# Patient Record
Sex: MALE | Race: WHITE | URBAN - METROPOLITAN AREA
[De-identification: names, ages, dates, MRNs, and addresses within clinical notes are randomized per-mention and may not be internally consistent; named-entity substitution may affect disease eponyms.]

---

## 2019-12-04 ENCOUNTER — TELEPHONE (OUTPATIENT)
Dept: BEHAVIORAL HEALTH | Facility: CLINIC | Age: 64
End: 2019-12-04

## 2019-12-04 ENCOUNTER — HOSPITAL ENCOUNTER (EMERGENCY)
Facility: CLINIC | Age: 64
Discharge: PSYCHIATRIC OR SUBSTANCE USE FACILITY | End: 2019-12-05
Attending: EMERGENCY MEDICINE | Admitting: EMERGENCY MEDICINE
Payer: COMMERCIAL

## 2019-12-04 DIAGNOSIS — S11.91XA STAB WOUND OF NECK, INITIAL ENCOUNTER: ICD-10-CM

## 2019-12-04 DIAGNOSIS — R45.851 SUICIDAL IDEATION: ICD-10-CM

## 2019-12-04 PROCEDURE — 99285 EMERGENCY DEPT VISIT HI MDM: CPT | Mod: 25

## 2019-12-04 PROCEDURE — 90791 PSYCH DIAGNOSTIC EVALUATION: CPT

## 2019-12-04 RX ORDER — ESCITALOPRAM OXALATE 10 MG/1
10 TABLET ORAL DAILY
Status: ON HOLD | COMMUNITY
End: 2019-12-09

## 2019-12-04 RX ORDER — BUPROPION HYDROCHLORIDE 150 MG/1
150 TABLET ORAL EVERY MORNING
Status: ON HOLD | COMMUNITY
End: 2019-12-09

## 2019-12-04 ASSESSMENT — MIFFLIN-ST. JEOR: SCORE: 1620.46

## 2019-12-04 ASSESSMENT — ENCOUNTER SYMPTOMS: WOUND: 1

## 2019-12-05 ENCOUNTER — HOSPITAL ENCOUNTER (INPATIENT)
Facility: CLINIC | Age: 64
LOS: 4 days | Discharge: HOME | DRG: 885 | End: 2019-12-09
Attending: PSYCHIATRY & NEUROLOGY | Admitting: PSYCHIATRY & NEUROLOGY
Payer: COMMERCIAL

## 2019-12-05 VITALS
OXYGEN SATURATION: 97 % | RESPIRATION RATE: 18 BRPM | TEMPERATURE: 98.8 F | SYSTOLIC BLOOD PRESSURE: 113 MMHG | HEART RATE: 71 BPM | HEIGHT: 67 IN | WEIGHT: 190 LBS | BODY MASS INDEX: 29.82 KG/M2 | DIASTOLIC BLOOD PRESSURE: 78 MMHG

## 2019-12-05 DIAGNOSIS — F32.A DEPRESSIVE EPISODE: Primary | ICD-10-CM

## 2019-12-05 PROBLEM — T14.91XA SUICIDE ATTEMPT (H): Status: ACTIVE | Noted: 2019-12-05

## 2019-12-05 LAB
AMPHETAMINES UR QL SCN: NEGATIVE
BARBITURATES UR QL: NEGATIVE
BENZODIAZ UR QL: NEGATIVE
CANNABINOIDS UR QL SCN: NEGATIVE
COCAINE UR QL: NEGATIVE
OPIATES UR QL SCN: NEGATIVE
PCP UR QL SCN: NEGATIVE

## 2019-12-05 PROCEDURE — 12400001 ZZH R&B MH UMMC

## 2019-12-05 PROCEDURE — 25000132 ZZH RX MED GY IP 250 OP 250 PS 637: Performed by: STUDENT IN AN ORGANIZED HEALTH CARE EDUCATION/TRAINING PROGRAM

## 2019-12-05 PROCEDURE — 80307 DRUG TEST PRSMV CHEM ANLYZR: CPT | Performed by: EMERGENCY MEDICINE

## 2019-12-05 RX ORDER — ALUMINA, MAGNESIA, AND SIMETHICONE 2400; 2400; 240 MG/30ML; MG/30ML; MG/30ML
30 SUSPENSION ORAL EVERY 4 HOURS PRN
Status: DISCONTINUED | OUTPATIENT
Start: 2019-12-05 | End: 2019-12-09 | Stop reason: HOSPADM

## 2019-12-05 RX ORDER — HYDROXYZINE HYDROCHLORIDE 25 MG/1
25 TABLET, FILM COATED ORAL EVERY 4 HOURS PRN
Status: DISCONTINUED | OUTPATIENT
Start: 2019-12-05 | End: 2019-12-09 | Stop reason: HOSPADM

## 2019-12-05 RX ORDER — ACETAMINOPHEN 325 MG/1
650 TABLET ORAL EVERY 4 HOURS PRN
Status: DISCONTINUED | OUTPATIENT
Start: 2019-12-05 | End: 2019-12-09 | Stop reason: HOSPADM

## 2019-12-05 RX ORDER — BUPROPION HYDROCHLORIDE 150 MG/1
150 TABLET ORAL DAILY
Status: DISCONTINUED | OUTPATIENT
Start: 2019-12-05 | End: 2019-12-06

## 2019-12-05 RX ORDER — ESCITALOPRAM OXALATE 10 MG/1
10 TABLET ORAL DAILY
Status: DISCONTINUED | OUTPATIENT
Start: 2019-12-05 | End: 2019-12-06

## 2019-12-05 RX ADMIN — BUPROPION 150 MG: 150 TABLET, EXTENDED RELEASE ORAL at 20:18

## 2019-12-05 RX ADMIN — ESCITALOPRAM OXALATE 10 MG: 10 TABLET ORAL at 20:18

## 2019-12-05 ASSESSMENT — ACTIVITIES OF DAILY LIVING (ADL)
FALL_HISTORY_WITHIN_LAST_SIX_MONTHS: NO
RETIRED_COMMUNICATION: 0-->UNDERSTANDS/COMMUNICATES WITHOUT DIFFICULTY
BATHING: 0-->INDEPENDENT
TRANSFERRING: 0-->INDEPENDENT
SWALLOWING: 0-->SWALLOWS FOODS/LIQUIDS WITHOUT DIFFICULTY
DRESS: 0-->INDEPENDENT
COGNITION: 0 - NO COGNITION ISSUES REPORTED
TOILETING: 0-->INDEPENDENT
RETIRED_EATING: 0-->INDEPENDENT
AMBULATION: 0-->INDEPENDENT

## 2019-12-05 ASSESSMENT — MIFFLIN-ST. JEOR: SCORE: 1536.55

## 2019-12-05 NOTE — ED NOTES
"Writer spoke with pt's wife Dorota. She is angry and refusing pt to be transferred to [facility]. She states \"whose paying for this? I have a major problem with you allowing him to make that decision when he is vulnerable. I want to speak to a manager. I will be there shortly to raise hell.\" MD, charge RN, and DEC updated.   "

## 2019-12-05 NOTE — ED NOTES
Report received. Patient visualized. Patient resting in bed with wife at bedside. After discussion, Patient and wife agree to transport and admission to [place]. Report to be called and patient will be transported for voluntary admission.

## 2019-12-05 NOTE — ED NOTES
Report called to Station 20 at [facility]. Nurse to Nurse report given to LAM Almeida. Will continue to monitor until transport.

## 2019-12-05 NOTE — TELEPHONE ENCOUNTER
S:  Per TUSHAR Valverde , pt wants to remain at [facility].  He has declined review at other facilities.    S:  Per Nicolle pt is now willing to go to [facility].    R:  Admit to station 20    accepts for himself   [place], willing to sign in      -  20, [place] informed 1425  -  ED, Nicolle informed 1423

## 2019-12-05 NOTE — TELEPHONE ENCOUNTER
S: [facility] ED seeking placement for 65yo male presenting with SI and plans     B: Hx Bipolar Disorder, comes to ED after taking a knife to his throat and cutting himself. Pt cuts required sutures. Pt was avi by the cutting and put the knife down and he waited for his wife to come home, and Pt daughter brought Pt into ED. Pt reports work and employment stressors, financial problems. Pt hx of IP MH in 2000. Pt hx of SA via carbon monoxide in garage. Pt denies medical concerns, is ambulatory. Pt denies CD hx. Pt continues to endorse SI with plans.     A: Medically cleared, utox pending, voluntary for admission     R: Pt placed on waitlist.

## 2019-12-05 NOTE — ED PROVIDER NOTES
No issues overnight.  Patient signed out to Dr. Mcdaniels with psych placement pending.     Chet Middleton MD  12/05/19 0783

## 2019-12-05 NOTE — ED PROVIDER NOTES
"  History     Chief Complaint:  Suicidal ideation    HPI   Arash Booker is a 64 year old male, with a history of OCD and bipolar 2 disorder, who presents with his daughter and son-in-law to the emergency department for evaluation of suicidal ideation. The patient reports he has been under increased stress at work and more financial pressure. He reports this stress and pressure became overwhelming to him today and he used a kitchen knife to cut his neck this afternoon around 1400 intending to harm himself. He denies any suicide attempts in the past. Of note, patient's last tetanus was in 2012.    Allergies:  No known drug allergies     Medications:    Wellbutrin  Lexapro  Lamictal    Past Medical History:    OCD  Bipolar 2 disorder    Past Surgical History:    History reviewed. No pertinent surgical history.    Family History:    Cataracts  HTN    Social History:  Smoking status: Never  Alcohol use: None  Drug use: No  The patient presents to the emergency department with his daughter and son-in-law.  Marital Status:   [2]     Review of Systems   Skin: Positive for wound.   Psychiatric/Behavioral: Positive for self-injury and suicidal ideas.         Physical Exam   Patient Vitals for the past 24 hrs:   BP Temp Temp src Heart Rate Resp SpO2 Height Weight   12/04/19 2105 138/78 98.8  F (37.1  C) Oral 68 16 98 % 1.702 m (5' 7\") 86.2 kg (190 lb)     Physical Exam  General: No distress.   Head: No signs of trauma.   Mouth/Throat: Oropharynx moist.   Eyes: Conjunctivae are normal. Pupils are equal..   Neck: Normal range of motion.    Resp:No respiratory distress.   MSK: Normal range of motion. No obvious deformity.  Neuro: The patient is alert and interactive. GIBSON. Speech normal. GCS 15  Skin:  Lacerations, as seen in picture below, to the anterior left neck. No hard or soft signs of airway injury.  Psych: Flat affect, endorses thoughts of suicide.        Emergency Department Course   Emergency Department " Course:  Past medical records, nursing notes, and vitals reviewed.  2151: I performed an exam of the patient and obtained history, as documented above.     2328: I discussed the patient with Dr. Wells, of vascular surgery, who will come and evaluated the patient in the ER.    Findings and plan explained to the Patient and son and daughter who consents to admission.     Patient will be signed out to my partner, Dr. Middleton, pending bed placement. Appropriate paperwork is finished.  Impression & Plan    Medical Decision Making:  This patient is brought into the emerge department by his family after he attempted to hurt himself by stabbing himself in the neck with a knife.  He endorses thoughts of suicide with an active plan.  He will be admitted to the hospital and is initially voluntary but if he should decide he is not voluntary he is appropriate for a 72-hour hold.    His neck wound was examined by the trauma surgeon and cleared medically for admission to psychiatric floor.  No hard and soft signs of penetrating neck injury  Hard signs of vascular injury*   Severe or uncontrolled hemorrhage   Large, expanding, or pulsatile hematoma   Thrills or bruits   Shock unresponsive to IV fluid resuscitation   Absent or diminished radial pulse   Neurologic deficit (eg, hemiplegia) consistent with cerebral ischemia   Hard signs of aerodigestive injury*   Air bubbling from a wound   Massive hemoptysis or hematemesis   Respiratory distress   Soft signs of injury   Proximity wounds   Minor hemorrhage   Mild hypotension responsive to IV fluid resuscitation   Minor hemoptysis or hematemesis   Subcutaneous or mediastinal air   Non-pulsatile, non-expanding hematoma   Dysphonia   Dysphagia         Diagnosis:    ICD-10-CM   1. Suicidal ideation R45.851   2. Stab wound of neck, initial encounter S11.91XA     Disposition:  Signed out to my Dr. Middleton awaiting psych bed availability.    Shamir Jacobsen  12/4/2019    EMERGENCY  DEPARTMENT  Scribe Disclosure:  I, Shamir Delmar, am serving as a scribe at 9:51 PM on 12/4/2019 to document services personally performed by Salvador Bautista MD based on my observations and the provider's statements to me.      Salvador Bautista MD  12/05/19 0057

## 2019-12-05 NOTE — PHARMACY-ADMISSION MEDICATION HISTORY
Pharmacy Medication History  Admission medication history interview status for the 12/4/2019  admission is complete. See EPIC admission navigator for prior to admission medications     Medication history sources: Patient and Care Everywhere  Medication history source reliability: Good  Adherence assessment: Good    Significant changes made to the medication list:  none      Additional medication history information:   none    Medication reconciliation completed by provider prior to medication history? No    Time spent in this activity: 15 min        Prior to Admission medications    Medication Sig Last Dose Taking? Auth Provider   buPROPion (WELLBUTRIN XL) 150 MG 24 hr tablet Take 150 mg by mouth every morning 12/4/2019 at Unknown time Yes Reported, Patient   escitalopram (LEXAPRO) 10 MG tablet Take 10 mg by mouth daily 12/4/2019 at Unknown time Yes Reported, Patient

## 2019-12-05 NOTE — ED PROVIDER NOTES
Face-to-face at [facility].  The patient was accepted over at [facility] station 20.  The patient initially stated he did not want to leave [facility] and that he was voluntary.  It was then discussed with him that we do not have any beds here he then agreed to go to [facility] where a bed was obtained.  His wife then called and was very upset that we are transferring him over to [facility].  She was very angry with the nurse.  I did a face-to-face and explained to him that we do not have beds here today if he chooses to stay we may not have beds available.  I explained to him that I am not sure what his insurance would cover or not cover.  However when we do not have beds at this hospital we transfer out and typically that is covered due to lack of services here.  The patient is awaiting the wife's arrival and will discuss it with him.  He is on a health officer hold at this time.  However if he does not like to leave the 72-hour hold will be signed.     Malini Mcdaniels MD  12/05/19 1656

## 2019-12-05 NOTE — ED TRIAGE NOTES
Patient cut neck with a kitchen knife earlier today in an attempt to commit suicide. Patient has a previous suicide attempt back in 2000 and was placed on Lexapro and Wellbutrin which he has been taking as prescribed. Patient stated that he has had increased stress from work and financial concerns lately.

## 2019-12-06 LAB
ALBUMIN SERPL-MCNC: 3.9 G/DL (ref 3.4–5)
ALP SERPL-CCNC: 71 U/L (ref 40–150)
ALT SERPL W P-5'-P-CCNC: 30 U/L (ref 0–70)
ANION GAP SERPL CALCULATED.3IONS-SCNC: 3 MMOL/L (ref 3–14)
AST SERPL W P-5'-P-CCNC: 17 U/L (ref 0–45)
BASOPHILS # BLD AUTO: 0 10E9/L (ref 0–0.2)
BASOPHILS NFR BLD AUTO: 0.3 %
BILIRUB SERPL-MCNC: 0.5 MG/DL (ref 0.2–1.3)
BUN SERPL-MCNC: 20 MG/DL (ref 7–30)
CALCIUM SERPL-MCNC: 8.8 MG/DL (ref 8.5–10.1)
CHLORIDE SERPL-SCNC: 104 MMOL/L (ref 94–109)
CO2 SERPL-SCNC: 29 MMOL/L (ref 20–32)
CREAT SERPL-MCNC: 1.11 MG/DL (ref 0.66–1.25)
DIFFERENTIAL METHOD BLD: NORMAL
EOSINOPHIL # BLD AUTO: 0.1 10E9/L (ref 0–0.7)
EOSINOPHIL NFR BLD AUTO: 1.3 %
ERYTHROCYTE [DISTWIDTH] IN BLOOD BY AUTOMATED COUNT: 12 % (ref 10–15)
GFR SERPL CREATININE-BSD FRML MDRD: 71 ML/MIN/{1.73_M2}
GLUCOSE SERPL-MCNC: 102 MG/DL (ref 70–99)
HCT VFR BLD AUTO: 44.7 % (ref 40–53)
HGB BLD-MCNC: 14.7 G/DL (ref 13.3–17.7)
IMM GRANULOCYTES # BLD: 0 10E9/L (ref 0–0.4)
IMM GRANULOCYTES NFR BLD: 0.2 %
LYMPHOCYTES # BLD AUTO: 1.4 10E9/L (ref 0.8–5.3)
LYMPHOCYTES NFR BLD AUTO: 22 %
MCH RBC QN AUTO: 31.4 PG (ref 26.5–33)
MCHC RBC AUTO-ENTMCNC: 32.9 G/DL (ref 31.5–36.5)
MCV RBC AUTO: 96 FL (ref 78–100)
MONOCYTES # BLD AUTO: 0.4 10E9/L (ref 0–1.3)
MONOCYTES NFR BLD AUTO: 6.9 %
NEUTROPHILS # BLD AUTO: 4.3 10E9/L (ref 1.6–8.3)
NEUTROPHILS NFR BLD AUTO: 69.3 %
NRBC # BLD AUTO: 0 10*3/UL
NRBC BLD AUTO-RTO: 0 /100
PLATELET # BLD AUTO: 249 10E9/L (ref 150–450)
POTASSIUM SERPL-SCNC: 4.2 MMOL/L (ref 3.4–5.3)
PROT SERPL-MCNC: 7.6 G/DL (ref 6.8–8.8)
RBC # BLD AUTO: 4.68 10E12/L (ref 4.4–5.9)
SODIUM SERPL-SCNC: 136 MMOL/L (ref 133–144)
TSH SERPL DL<=0.005 MIU/L-ACNC: 2.02 MU/L (ref 0.4–4)
VIT B12 SERPL-MCNC: 357 PG/ML (ref 193–986)
WBC # BLD AUTO: 6.2 10E9/L (ref 4–11)

## 2019-12-06 PROCEDURE — 99223 1ST HOSP IP/OBS HIGH 75: CPT | Mod: AI | Performed by: PSYCHIATRY & NEUROLOGY

## 2019-12-06 PROCEDURE — H2032 ACTIVITY THERAPY, PER 15 MIN: HCPCS

## 2019-12-06 PROCEDURE — 36415 COLL VENOUS BLD VENIPUNCTURE: CPT | Performed by: STUDENT IN AN ORGANIZED HEALTH CARE EDUCATION/TRAINING PROGRAM

## 2019-12-06 PROCEDURE — 80053 COMPREHEN METABOLIC PANEL: CPT | Performed by: STUDENT IN AN ORGANIZED HEALTH CARE EDUCATION/TRAINING PROGRAM

## 2019-12-06 PROCEDURE — 82607 VITAMIN B-12: CPT | Performed by: STUDENT IN AN ORGANIZED HEALTH CARE EDUCATION/TRAINING PROGRAM

## 2019-12-06 PROCEDURE — 84443 ASSAY THYROID STIM HORMONE: CPT | Performed by: STUDENT IN AN ORGANIZED HEALTH CARE EDUCATION/TRAINING PROGRAM

## 2019-12-06 PROCEDURE — 85025 COMPLETE CBC W/AUTO DIFF WBC: CPT | Performed by: STUDENT IN AN ORGANIZED HEALTH CARE EDUCATION/TRAINING PROGRAM

## 2019-12-06 PROCEDURE — 12400001 ZZH R&B MH UMMC

## 2019-12-06 PROCEDURE — 82306 VITAMIN D 25 HYDROXY: CPT | Performed by: STUDENT IN AN ORGANIZED HEALTH CARE EDUCATION/TRAINING PROGRAM

## 2019-12-06 PROCEDURE — G0177 OPPS/PHP; TRAIN & EDUC SERV: HCPCS

## 2019-12-06 PROCEDURE — 25000132 ZZH RX MED GY IP 250 OP 250 PS 637: Performed by: STUDENT IN AN ORGANIZED HEALTH CARE EDUCATION/TRAINING PROGRAM

## 2019-12-06 RX ORDER — BUPROPION HYDROCHLORIDE 150 MG/1
150 TABLET ORAL DAILY
Status: COMPLETED | OUTPATIENT
Start: 2019-12-06 | End: 2019-12-06

## 2019-12-06 RX ORDER — ESCITALOPRAM OXALATE 10 MG/1
10 TABLET ORAL DAILY
Status: COMPLETED | OUTPATIENT
Start: 2019-12-06 | End: 2019-12-06

## 2019-12-06 RX ORDER — ESCITALOPRAM OXALATE 20 MG/1
20 TABLET ORAL DAILY
Status: DISCONTINUED | OUTPATIENT
Start: 2019-12-07 | End: 2019-12-09 | Stop reason: HOSPADM

## 2019-12-06 RX ORDER — BUPROPION HYDROCHLORIDE 300 MG/1
300 TABLET ORAL DAILY
Status: DISCONTINUED | OUTPATIENT
Start: 2019-12-07 | End: 2019-12-09 | Stop reason: HOSPADM

## 2019-12-06 RX ADMIN — ESCITALOPRAM OXALATE 10 MG: 10 TABLET ORAL at 08:14

## 2019-12-06 RX ADMIN — BUPROPION 150 MG: 150 TABLET, EXTENDED RELEASE ORAL at 11:36

## 2019-12-06 RX ADMIN — BUPROPION 150 MG: 150 TABLET, EXTENDED RELEASE ORAL at 08:14

## 2019-12-06 RX ADMIN — ESCITALOPRAM OXALATE 10 MG: 10 TABLET ORAL at 11:37

## 2019-12-06 ASSESSMENT — ACTIVITIES OF DAILY LIVING (ADL)
HYGIENE/GROOMING: INDEPENDENT
ORAL_HYGIENE: INDEPENDENT
DRESS: INDEPENDENT

## 2019-12-06 NOTE — PROGRESS NOTES
"    ----------------------------------------------------------------------------------------------------------  [facility], [place]   Psychiatric Progress Note  Hospital Day #1     Interim History:   The patient's care was discussed with the treatment team and chart notes were reviewed.    Sleep: 7 hours  Scheduled Medications: Taken as prescribed.  PRN Medications: None taken    Staff Report: No notable staff reports as patient came in later yesterday.    Patient Interview: Arash was interviewed in conference room.    When asked how he was doing Arash replied, \"Feeling pretty good. Not dealing with work.\" Arash denied any suicidal ideation or self injurious behavior and expresses happiness that he did not go through with the suicide. Regarding the suicide attempt, he states \"I regret doing it.\"    Arash's past psychiatric history was reviewed with him. He endorses a long history of bipolar disorder type II. He endorses periods of hypomania occurring 1-2 times per year and characterized as \"thinking every idea I have is great.\" He does have a history significant for 1 previous suicide attempt in 2000 via CO poisoning. He identified discontinuing his meds (as he had been previously feeling well) as the trigger for the 2000 incident. He does not think that he has OCD and states that it is a mistake that it is in his chart.    When asked about recent stressors that lead up to the recent suicidal ideation, Arash mainly discussed work. He has been under a lot of stress at a job that he took this year. His responsibilities have expanded beyond what he can handle and he also learned that company was in trouble and was told by his boss to start looking for another job. The increasing stress of work and potential for losing his job precipitated a depressive spell and lead to the suicidal behavior. He reports sleeping more than usual and has noticed some unexpected weight loss.     Arash states " "that he has a strong social support system. He is  with children and 3 grandchildren that he enjoyed talking about. He stated that his hobbies include sports and hunting, but that he has not been able to participate in them much recently due to work.    3:00 PM UPDATE: Attempted to call wife, did not answer phone. Left message with unit phone number.    The risks, benefits, alternatives and side effects of any medication changes have been discussed and are understood by the patient and other caregivers.    Review of systems:     ROS was negative unless noted above.       Allergies:   No Known Allergies    Psychiatric Examination:   /80 (BP Location: Right arm)   Pulse 72   Temp 97.6  F (36.4  C) (Temporal)   Resp 16   Ht 1.702 m (5' 7\")   Wt 77.8 kg (171 lb 8 oz)   BMI 26.86 kg/m    Weight is 171 lbs 8 oz  Body mass index is 26.86 kg/m .    Appearance:  awake, alert  Attitude:  cooperative  Eye Contact:  good  Mood:  \"Feeling pretty good.\"  Affect:  Mood congruent. Normal range.  Speech:  clear, coherent  Psychomotor Behavior:  no evidence of tardive dyskinesia, dystonia, or tics  Thought Process:  logical, linear and goal oriented  Associations:  no loose associations  Thought Content:  Does not appear to be responding to internal stimuli, denies active suicidal ideation and homicidal ideation.  Insight:  good  Judgment:  intact  Oriented to:  Situation  Attention Span and Concentration:  Adequate for conversation.  Recent and Remote Memory:  Intact.  Language: Speaks English with appropriate vocabulary and syntax  Fund of Knowledge: appropriate  Muscle Strength and Tone: Normal  Gait and Station: Normal.    Neck: Clean bandage covering puncture wounds.     Labs:     Admission on 12/05/2019   Component Date Value Ref Range Status     WBC 12/06/2019 6.2  4.0 - 11.0 10e9/L Final     RBC Count 12/06/2019 4.68  4.4 - 5.9 10e12/L Final     Hemoglobin 12/06/2019 14.7  13.3 - 17.7 g/dL Final     " Hematocrit 12/06/2019 44.7  40.0 - 53.0 % Final     MCV 12/06/2019 96  78 - 100 fl Final     MCH 12/06/2019 31.4  26.5 - 33.0 pg Final     MCHC 12/06/2019 32.9  31.5 - 36.5 g/dL Final     RDW 12/06/2019 12.0  10.0 - 15.0 % Final     Platelet Count 12/06/2019 249  150 - 450 10e9/L Final     Diff Method 12/06/2019 Automated Method   Final     % Neutrophils 12/06/2019 69.3  % Final     % Lymphocytes 12/06/2019 22.0  % Final     % Monocytes 12/06/2019 6.9  % Final     % Eosinophils 12/06/2019 1.3  % Final     % Basophils 12/06/2019 0.3  % Final     % Immature Granulocytes 12/06/2019 0.2  % Final     Nucleated RBCs 12/06/2019 0  0 /100 Final     Absolute Neutrophil 12/06/2019 4.3  1.6 - 8.3 10e9/L Final     Absolute Lymphocytes 12/06/2019 1.4  0.8 - 5.3 10e9/L Final     Absolute Monocytes 12/06/2019 0.4  0.0 - 1.3 10e9/L Final     Absolute Eosinophils 12/06/2019 0.1  0.0 - 0.7 10e9/L Final     Absolute Basophils 12/06/2019 0.0  0.0 - 0.2 10e9/L Final     Abs Immature Granulocytes 12/06/2019 0.0  0 - 0.4 10e9/L Final     Absolute Nucleated RBC 12/06/2019 0.0   Final     Sodium 12/06/2019 136  133 - 144 mmol/L Final     Potassium 12/06/2019 4.2  3.4 - 5.3 mmol/L Final     Chloride 12/06/2019 104  94 - 109 mmol/L Final     Carbon Dioxide 12/06/2019 29  20 - 32 mmol/L Final     Anion Gap 12/06/2019 3  3 - 14 mmol/L Final     Glucose 12/06/2019 102* 70 - 99 mg/dL Final     Urea Nitrogen 12/06/2019 20  7 - 30 mg/dL Final     Creatinine 12/06/2019 1.11  0.66 - 1.25 mg/dL Final     GFR Estimate 12/06/2019 71  >60 mL/min/[1.73_m2] Final    Comment: Non  GFR Calc  Starting 12/18/2018, serum creatinine based estimated GFR (eGFR) will be   calculated using the Chronic Kidney Disease Epidemiology Collaboration   (CKD-EPI) equation.       GFR Estimate If Black 12/06/2019 82  >60 mL/min/[1.73_m2] Final    Comment:  GFR Calc  Starting 12/18/2018, serum creatinine based estimated GFR (eGFR) will be    calculated using the Chronic Kidney Disease Epidemiology Collaboration   (CKD-EPI) equation.       Calcium 12/06/2019 8.8  8.5 - 10.1 mg/dL Final     Bilirubin Total 12/06/2019 0.5  0.2 - 1.3 mg/dL Final     Albumin 12/06/2019 3.9  3.4 - 5.0 g/dL Final     Protein Total 12/06/2019 7.6  6.8 - 8.8 g/dL Final     Alkaline Phosphatase 12/06/2019 71  40 - 150 U/L Final     ALT 12/06/2019 30  0 - 70 U/L Final     AST 12/06/2019 17  0 - 45 U/L Final     TSH 12/06/2019 2.02  0.40 - 4.00 mU/L Final     Vitamin B12 12/06/2019 357  193 - 986 pg/mL Final          Assessment    Diagnostic Impression: Arash Booker is a 64 year old male with a reported past psychiatric history of bipolar II and OCD who presented to the [facility] ED s/p suicide attempt where he cut his neck with a kitchen knife in the context of increasing depression, hopelessness and overwhelming pressure from his work. Significant symptoms include SI, irritable, depressed and neurovegetative symptoms. His last psychiatric hospitalization was in 2000 following a suicide attempt with carbon monoxide poisoning in his garage. He has been stable on the same medications of bupropion and escitalopram since then, with good medication adherence. He currently does not follow-up with a mental health provider, and he gets his medications through his primary care provider. Current psychosocial stressors include increasing pressure from work. 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 He has been coping by withdrawing and this one recent incident of self-harm.  He has a strong support system from his family.  Substance use does not appear to be playing a contributing role in the patient's presentation. He also did not report any significant past medical history.       The MSE on admission was notable for a depressed mood and recent suicidal ideation. He reports having no thoughts of wanting to hurt himself currently and  has no history of self injurious behaviors. His reported symptoms of a depressed mood with SI are consistent with his historic diagnosis of having a mood disorder, though it is questionable if he truly had a history of a hypomanic episode. In addition, he has tolerated and seen benefit from an SSRI for almost 2 decades.      Given that he currently is s/p suicide attempt, patient warrants inpatient psychiatric hospitalization to maintain his safety. Disposition pending clinical stabilization, medication optimization and development of an appropriate discharge plan.     Hospital course: Arash Booker was admitted to station 20 as a voluntary patient under the care of Dr. Tompkins. On 12/6, bupropion was increased to 300 mg daily and escitalopram was increased to 20 mg daily.    Discontinued Medications (& Rationale): None    Medical course Arash Booker was medically cleared for admission to the inpatient psychiatric unit. In the ED, his neck wound was examined and dressed. Vascular surgery was consulted and evaluated him at [facility]. His lacerations showed no signs of a hematoma, or injury to his airway or major vasculature. He was calm and cooperative, requiring no PRNs.     Plan   Principal psychiatric diagnosis:   #Bipolar affective disorder, type 2, current episode depressed vs unspecified depressive disorder   - Increase escitalopram to 20 mg daily   - Increase bupropion to 300 mg daily     Patient will be treated in therapeutic milieu with appropriate individual and group therapies as described.    Medical diagnoses to be addressed this admission:      # Laceration on anterior L neck  - Continue to monitor for infection    Legal Status: Voluntary    Safety Assessment:   Behavioral Orders   Procedures     Code 1 - Restrict to Unit     Routine Programming     As clinically indicated     Self Injury Precaution     Status 15     Every 15 minutes.     Suicide precautions     Patients on Suicide Precautions  should have a Combination Diet ordered that includes a Diet selection(s) AND a Behavioral Tray selection for Safe Tray - with utensils, or Safe Tray - NO utensils         Disposition: Pending clinical stabilization and formulation of a safe discharge plan.    -Michelet Bradford, MS3    I was present with the medical student who participated in the service and in the documentation of the note. I have verified the history and personally performed the physical exam and medical decision making. I agree with the assessment and plan of care as documented in the note. Sadaf Rose MD, Psychiatry PGY-1      Attestation:  I have reviewed the resident admit note and confirmed by my exam today the HPI, past psych, PMH, ROS, meds, allergies, family and social histories.  I have also reviewed all available labs and vital signs.  I, Trent Tompkins, saw and evaluated the patient with the resident physician.  I agree with the findings and plan of care as documented in the resident note.  I have reviewed all labs and vital signs.

## 2019-12-06 NOTE — PLAN OF CARE
BEHAVIORAL TEAM DISCUSSION    Participants:   Dr. Tompkins, Dr. Rose, Subha Sousa MA.ISIS, Cliff ANN RN, Med student    Anticipated length of stay:   3-5 days    Continued Stay Criteria/Rationale:   Worsening depression, suicidal ideation    Medical/Physical:   Stable    Precautions:   Behavioral Orders   Procedures     Code 1 - Restrict to Unit     Routine Programming     As clinically indicated     Self Injury Precaution     Status 15     Every 15 minutes.     Suicide precautions     Patients on Suicide Precautions should have a Combination Diet ordered that includes a Diet selection(s) AND a Behavioral Tray selection for Safe Tray - with utensils, or Safe Tray - NO utensils       Plan:   Patient will have ongoing psychiatric assessment.  Medications will be reviewed and adjusted per MD as indicated.  Outpatient providers/family will be contacted for care coordination.  Hospital staff will provide a safe environment and a therapeutic milieu. Patient will be encouraged to participate in unit groups and activities.   Patient will be referred for individual therapy.  CTC will continue to assess needs and  ensure appropriate follow up care is in place.         Rationale for change in precautions or plan:   No change in plan/precautions

## 2019-12-06 NOTE — PLAN OF CARE
Problem: General Plan of Care (Inpatient Behavioral)  Goal: Individualization/Patient Specific Goal (IP Behavioral)  Description  The patient and/or their representative will achieve their patient-specific goals related to the plan of care.    The patient-specific goals include:  Flowsheets (Taken 12/6/2019 0857)  Patient Strengths: Steady employment; Stable and supportive family; Involved in community; Optimistic that change can occur; Financial stability; Stable housing; Has vocational interests i.e., hobbies; Adherent to medication regime  Note:   Patient's goals:  Feel better    Plan for admission:  1. Stabilization of mood disorder symptoms  2. Absence of SI- safe with self  3. Medication management per MD's  4. Coordination of care with outpatient providers, family  5.. Psychiatric follow up care in place

## 2019-12-06 NOTE — PHARMACY-ADMISSION MEDICATION HISTORY
Please see Admission Medication History note completed on 12/5/19 under previous encounter at [facility] for information regarding prior to admission medications.    Percy Max PharmD  [facility]  Emergency Department: [place] *[id number]

## 2019-12-06 NOTE — PROGRESS NOTES
12/06/19 1221   General Information   Date Initially Attended OT 12/06/19     Attended a combined collaborative multi-step hands-on meal preparation group / group game.  Pt Response: Somewhat restricted affect. Opted to observe only within baking portion and most of group games. After encouragement, joined a card game with peers.     OT staff will meet with pt to review the role of occupational therapy and explain the value of having them involved in their treatment plan including options to meet current needs/self-identified goals. As group attendance is established,  continued clinical observations will be made and Pt will be given self-assessment to inform OT initial assessment.

## 2019-12-06 NOTE — PROGRESS NOTES
12/05/19 1902   Patient Belongings   Did you bring any home meds/supplements to the hospital?  No   Patient Belongings locker   Patient Belongings Put in Hospital Secure Location (Security or Locker, etc.) clothing;cell phone/electronics   Belongings Search Yes   Clothing Search Yes   Second Staff Tramaine Cheng locker- Jeans, cell phone, belt, shoes, long sleeve shirt, under shirt, coat, , wallet, 's license.      Security-$10, [employer] [id number].      Brought in by his family on 12/6/19 at 19:00pm (yj)  Grey pair of socks   Blue pair of socks   Black pair of socks   Grey flannel shirt   Blue jeans   White shirt   Grey shirt   Green shirt   burgundy flannel shirt   Blue shirt   Grey boxers x3    A               Admission:  I am responsible for any personal items that are not sent to the safe or pharmacy.  [facility] is not responsible for loss, theft or damage of any property in my possession.    Signature:  _________________________________ Date: _______  Time: _____                                              Staff Signature:  ____________________________ Date: ________  Time: _____      2nd Staff person, if patient is unable/unwilling to sign:    Signature: ________________________________ Date: ________  Time: _____     Discharge:  [facility] has returned all of my personal belongings:    Signature: _________________________________ Date: ________  Time: _____                                          Staff Signature:  ____________________________ Date: ________  Time: _____

## 2019-12-06 NOTE — PROGRESS NOTES
Telephone call with wife Dorota Booker:   Ms. Booker reports that Arash has been taking bupropion and escitalopram since 1998 and that they both knew he was currently on a maintenance dose. She is in agreement with him staying over the weekend and resuming care with a psychiatrist after discharge. She reports that she had the feeling that he was getting worse and nearing the point of harming himself, however she did not think that it would be to this extent. She plans to visit him tonight and bring him clothes.    Sadaf Rose MD  Psychiatry PGY-1

## 2019-12-06 NOTE — PROGRESS NOTES
INITIAL PSYCHOSOCIAL ASSESSMENT   I have reviewed the chart met with the patient, and developed Care Plan.  Information for assessment was obtained from: Patient/patient chart    Presenting Problem:   The patient  is a 64 year old male with a self-reported past psychiatric history of  bipolar II disorder who transferred from the [facility] ED after a suicide attempt in the context of increasing work-related stressors.    Patient states things began with increasing pressure at his work. 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 This past summer, the  of the company told him that the company was struggling financially, and shortly after, his administrative duties increased. He has had to manage many of the company's problems, and this has placed increasing pressure on him. Things got to the point that 1 1/2 weeks ago, he began having suicidal ideation. He did not act on it until yesterday.  He did not go to work yesterday due to feeling depressed and overwhelmed. After his wife left for work, he went to the kitchen and took a knife to his neck. At that instant, he reports he truly felt like he wanted to end his life. He cut 2 superficial puncture wounds and then stopped, feeling he could not go through with it.  When wife retunred home she transported patient to the ED  The following areas have been assessed:  History of Mental Health and Chemical Dependency:  Patient reports a long history of depression.  Patient has had one prior admission in 2000 2' suicide attempt via CO2 poisoning.  He has no h/o SIB    Patient denies any current or past abuse of alcohol/illicit substances.     Family Description (Constellation, Family Psychiatric History):   Patient was born/raised in [facility] in an intact family.  He is the eldest of 10 children.  Parents are still alive.(father 89, mom 84)    Patient is  with 4 adult children  -3 daughters and 1 son, 3 grandchildren.    Patient  denies any family history of mental illness/substance abuse.    Significant Life Events (Illness, Abuse, Trauma, Death):  Patient denies any trauma history    Living Situation:     Patient lives in [place] with his wife.    Educational Background:   Patient has obtained a BA in Latin Classics    Occupational History:   Patient is a  for a [employer] company    Financial Status:    No immediate financial concerns     Legal Issues:    None    Ethnic/Cultural Issues:  No concerns identified    Spiritual Orientation:    Confucianist.  Patient is active in his Yazdanism     Service History:   N/A    Social Functioning (organization, interests):  Patient enjoys sports- racquetball, softball, hunting                                                     Current Treatment Providers are:  Primary Care: Mars Maloney [facility]    Social Service Assessment/Plan:  Patient will have ongoing psychiatric assessment.  Medications will be reviewed and adjusted per MD as indicated.  Outpatient providers will be contacted for care coordination.  Hospital staff will provide a safe environment and a therapeutic milieu. Patient will be encouraged to participate in unit groups and activities.   CTC will continue to assess needs and  ensure appropriate follow up care is in place.

## 2019-12-06 NOTE — PLAN OF CARE
Pt and this writer had an extensive conversation about his recent suicide attempt.  The patient stated his attempt was more of a cry for help than a true attempt.  Patient stated his work stressors have been increasing greatly recently and that he was more and more unable to cope with those stressors.  The patient stated after he is discharged he is going to have a candy discussion with his employer regarding those stressors.  Patient does state that he has an adequate support system outside the hospital, in fact he stated that he will have to limit the family members that come to visit as too many of them have stated they want to visit.  Pt has a full, bright affect and is extremely pleasant and encouraging with staff and peers.

## 2019-12-06 NOTE — PROGRESS NOTES
12/6/19 [facility]   Art Therapy   Type of Intervention structured groups   Response observed   Hours .5   Treatment Detail    (Art Therapy)- kinhermannugi - healing trauma metaphor   Goal- cope, express, regulate and reflect through Art Therapy directives     Outcome- Pt enjoyed observing only and listening to patients speak about their art.

## 2019-12-06 NOTE — H&P
"Psychiatry History and Physical    Arash Booker MRN# [id number]   Age: 64 year old YOB: 1955     Date of Admission:  12/5/2019  Admitting Physician:  Dr. Tompkins          Contacts:     Primary Outpatient Psychiatrist: None  Primary Physician: Mars Gloria  Therapist: None  Family Members: Natalia Booker (wife)         Chief Complaint:     \"Work had been increasingly stressful and I started thinking about hurting myself\"         History of Present Illness:     History obtained from patient and electronic chart    Arash Booker is a 64 year old male with a self-reported past psychiatric history of OCD and bipolar II disorder who transferred from the [facility] ED on 12/05/2019 after a suicide attempt on 12/4/19 in the context of increasing work-related stressors, no substance use and good medication adherence (currently taking bupropion 150 mg daily and escitalopram 10 mg daily).    Arash states things began with increasing pressure at his work. 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 This past summer, the  of the company told him that the company was struggling financially, and shortly after, his administrative duties increased. He has had to manage many of the company's problems, and this has placed increasing pressure on him. Things got to the point that 1 1/2 weeks ago, he began having suicidal ideation. He did not act on it until yesterday.    He did not go to work yesterday due to feeling depressed and overwhelmed. After his wife left for work, he went to the kitchen and took a knife to his neck. At that instant, he reports he truly felt like he wanted to end his life. He cut 2 superficial puncture wounds and then stopped, feeling he could not go through with it. He then \"went and took a shower and cleaned up and sat back in the chair I was sitting in when my wife left. She wasn't happy when she came home and saw me in the same chair as when she " "left me.\" He notes that he thought about covering up his neck wound with a band-aid and telling her he had a shaving accident. His wife did see the cut and became extremely worried because he had a history of a suicide attempt back in 2000 where he attempted with carbon monoxide poisoning, running 2 cars in a closed garage. His wife works in medical billing and was hesitant to have him be admitted psychiatrically because of the high restrictions on psychiatric units. His daughter, who was also extremely worried about him, encouraged him to go the hospital at [facility].     Currently he is not feeling suicidal, and states his actions were \"dumb\". He does want to get help and asked who he would be speaking to tomorrow and if there were group therapy sessions. Of note, he appears high functioning and genuinely engaged in seeking help. For the past week and a half, he has been experiencing low energy, feelings of helplessness/hopelessness, SI and irritability. He denies having any sleep or appetite issues and does not have any guilt or blame on himself. He reports no symptoms of alem, panic, psychosis, or anxiety other than his job worries.    He has a self-reported diagnosis of bipolar II, and he states that he primarily has depressive episodes. When asked about any manic symptoms, he stated he has had an elevated mood with grand ideas in the past, but never engaged in risky behavior or had decreased need for sleep. His reported that his behavior never got him into any trouble or impaired his ability to function in work and relationships. He is currently taking bupropion and escitalopram, and he has for the past 19+ years. He has been on the same maintenance dose for the past 6-7 years, and is consistently adherent. He does not have a primary psychiatrist and gets his prescriptions through his primary care doctor. He requested to continue with his PTA medications, and he did not receive them today in the ED at " "[facility].    He reports having no medical history, no history of any trauma, and no use of any drugs or nicotine. He drinks occasionally, and reports no past struggles with alcohol abuse. His neck wound was appropriately dressed, and he had no other medical concerns tonight.    Per ED Note:   \"Arash Booker is a 64 year old male, with a history of OCD and bipolar 2 disorder, who presents with his daughter and son-in-law to the emergency department for evaluation of suicidal ideation. The patient reports he has been under increased stress at work and more financial pressure. He reports this stress and pressure became overwhelming to him today and he used a kitchen knife to cut his neck this afternoon around 1400 intending to harm himself. He denies any suicide attempts in the past. Of note, patient's last tetanus was in 2012.    Impression & Plan  Medical Decision Making:  This patient is brought into the emerge department by his family after he attempted to hurt himself by stabbing himself in the neck with a knife.  He endorses thoughts of suicide with an active plan.  He will be admitted to the hospital and is initially voluntary but if he should decide he is not voluntary he is appropriate for a 72-hour hold.  \"    He was medically cleared for admission to inpatient psychiatric unit.    ED/Hospital Course   In the ED, his neck wound was examined and dressed. Vascular surgery was consulted and evaluated him at [facility]. His lacerations showed no signs of a hematoma, or injury to his airway or major vasculature. He was calm and cooperative, requiring no PRNs. On admission to Station 22, his PTA medications of bupropion and escitalopram were resumed.    The risks, benefits, alternatives and side effects have been discussed and are understood by the patient and other caregivers.         Psychiatric Review of Systems:     Depressive: reports  suicidal ideation, depressed mood, low energy and feeling worthless. " Denies insomnia, hypersomnia and appetite changes   Psychosis: Denies delusions, auditory hallucinations and visual hallucinations  Bernie: Denies increased energy, decreased sleep need, increased activity, grandiosity and pressured speech  Anxiety: Reports worries pertaining to work only. Denies ruminations and panic  PTSD: Denies trauma  ADHD: Reports none  Disordered Eating: Reports none         Medical Review of Systems:     The Review of Systems is negative other than what is noted in the HPI         Psychiatric History:     Prior diagnoses: previous psychiatric diagnoses include Bipolar II and OCD.     Hospitalizations: One previous at [facility] in Fall of 2000 for a suicide attempt.    Court Committments: None per patient report and chart review    Suicide attempts: One previous in 2000 where he attempted suicide by carbon monoxide poisoning. He ran 2 cars in a closed garage.    Self-injurious behavior: None per patient report and chart review     Guns: no    Violence: None per patient report and chart review     ECT/TMS: None per patient report and chart review    Past medications:   per patient report:  A 15+ year history being on escitalopram and bupropion - continuing to take and medication adherent. Reports benefit from them.  He also noted he tried discontinuing them over 15 years ago after he felt like he was doing well and went into a deep depression, so he never went off of his medications again.    Per chart review he had previously been on Lamictal.         Substance Use History:     Alcohol: on occasion. Never had a history of alcohol abuse    Nicotine: Never smoker    Illicit Substances: Reports he has never done drugs in his life    Chemical Dependency Treatment: None         Social History:     Biographical: [place] in [place]. He is the oldest of 10 siblings. He reports having a happy and stable upbringing with a very caring family.    Family/Relationships:  "Currently  with 3 daughters and one son, all who are grown. Family is very supportive of him    Living Situation: currently lives in [place] in a home with his wife.     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     Abuse/Trauma: Reports no history of any physical, emotional, or sexual trauma      Service: None         Past Medical History:   Reports no history of: head trauma with or without loss of consciousness and seizures  No other past medical or surgical history reported.  No past medical history on file.  No past surgical history on file.       Allergies:    No Known Allergies       Medications:     No current outpatient medications on file.             Family History:   Psychiatric Family Hx: Not discussed    No family history on file.         Labs:     Recent Results (from the past 24 hour(s))   Drug abuse screen urine    Collection Time: 12/05/19 10:07 AM   Result Value Ref Range    Amphetamine Qual Urine Negative NEG^Negative    Barbiturates Qual Urine Negative NEG^Negative    Benzodiazepine Qual Urine Negative NEG^Negative    Cannabinoids Qual Urine Negative NEG^Negative    Cocaine Qual Urine Negative NEG^Negative    Opiates Qualitative Urine Negative NEG^Negative    PCP Qual Urine Negative NEG^Negative          Psychiatric Examination:   /73   Pulse 61   Temp 97.9  F (36.6  C) (Oral)   Resp 16   Ht 1.702 m (5' 7\")   Wt 77.8 kg (171 lb 8 oz)   BMI 26.86 kg/m      Appearance:  awake, alert, appeared as age stated, clean, dry bandage covering wound on left anterior neck and neatly groomed   Attitude:  calm and cooperative  Eye Contact:  good  Mood:  \"okay\"  Affect:  mood congruent and full range  Speech:  clear, coherent, normal rate, tone, and volume  Psychomotor Behavior:  no " evidence of tardive dyskinesia, dystonia, or tics, no fidgeting, psychomotor agitation or tremor  Thought Process:  linear and goal oriented, not tangential or circumferential  Associations:  no loosening of associations  Thought Content:  no evidence of current suicidal ideation or homicidal ideation. No auditory or visual hallucinations  Insight:  good  Judgment:  good  Oriented to:  time, person, and place  Attention Span and Concentration:  intact  Recent and Remote Memory:  not formally tested but grossly intact  Language:  english with appropriate syntax and vocabulary  Fund of Knowledge: appropriate  Muscle Strength and Tone: normal  Gait and Station: Normal         Physical Exam:     See ED assessment note by ED physician on 12/4/19    Gen: awake and alert, no apparent distress  HEENT: EOMI, clean, dry and intact bandage covering 3-4 cm portion of left anterior neck.  Resp: equal chest rise, no increased work of breathing  CV: No peripheral edema  MSK: Normal range of motion  Skin: No bruises or rashes present. Laceration on neck appropriately dressed        Assessment   Arash Booker is a 64 year old white male with a reported past psychiatric history of bipolar II and OCD who presented to the [facility] ED s/p suicide attempt where he cut his neck with a kitchen knife in the context of increasing depression, hopelessness and overwhelming pressure from his work. Significant symptoms include SI, irritable, depressed and neurovegetative symptoms. His last psychiatric hospitalization was in 2000 following a suicide attempt with carbon monoxide poisoning in his garage. He has been stable on the same medications of bupropion and escitalopram since then, with good medication adherence. He currently does not follow-up with a mental health provider, and he gets his medications through his primary care provider. Current psychosocial stressors include increasing pressure from work. 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 He has been coping by withdrawing and this one recent incident of self-harm.  He has a strong support system from his family.  Substance use does not appear to be playing a contributing role in the patient's presentation. He also did not report any significant past medical history.      The MSE is notable for a depressed mood and recent suicidal ideation. He reports having no thoughts of wanting to hurt himself currently and has no history of self injurious behaviors. His reported symptoms of a depressed mood with SI are consistent with his historic diagnosis of having a mood disorder, though it is questionable if he truly had a history of a hypomanic episode. In addition, he has tolerated and seen benefit from an SSRI for almost 2 decades. Differential should also consider an adjustment disorder vs. Major Depressive Disorder.    Given that he currently is s/p suicide attempt, patient warrants inpatient psychiatric hospitalization to maintain his safety. Disposition pending clinical stabilization, medication optimization and development of an appropriate discharge plan.    Risk for harm is low.  Risk factors: SI, past behaviors and work-related stressors  Protective factors: family, healthy coping skills and engaged in treatment     Principal psychiatric diagnosis:   - Mood Disorder, unspecified with suicide attempt  - Rule-out Adjustment Disorder vs. Major Depressive Disorder    Secondary psychiatric diagnoses:   - History of OCD, per documentation        Plan     Admit to [facility], [facility] Team with Attending Physician Dr. Leda M.D.    Medications:   Outpatient medications held:     None    Outpatient medications continued:   - Bupropion  mg daily  - escitalopram 10 mg daily    New medications initiated:   Hospital PRNs  - Milk of magnesia 30 mL PRN constipation  - Hydroxyzine 25-50 mg Q6H PRN for anxiety  - Tylenol 650 mg Q4H PRN  for pain and fever  - Mylanta 30 ml Q4H PRN for indigestion    Medications: risks/benefits discussed with patient    Patient will be treated in therapeutic milieu with appropriate individual and group therapies.    Laboratory/Imaging:  - Labs: CMP, CBC, TSH, B12, Vit D ordered for AM    Legal Status:   Voluntary    Safety Assessment:    Checks: Status 15  Precautions: Suicide    Consults:  - none    Medical diagnoses to be addressed this admission:     #. Laceration on anterior L neck  - Dressed in ED prior to arrival.  - Continue to monitor for infection  - Defer to day team if wound care is necessary    Dispo: unknown pending medication management and clinical stabilization    Patient to be staffed with the attending physician in the morning.    -------------------------------------------------------  Avel Slater MD, MS  PGY-2 Psychiatry Resident    Psychiatry Attending Attestation:  12/6/19  Please see progress note 12/6/19.  Trent Tompkins MD

## 2019-12-07 PROCEDURE — 12400001 ZZH R&B MH UMMC

## 2019-12-07 PROCEDURE — 25000132 ZZH RX MED GY IP 250 OP 250 PS 637: Performed by: STUDENT IN AN ORGANIZED HEALTH CARE EDUCATION/TRAINING PROGRAM

## 2019-12-07 RX ADMIN — BUPROPION HYDROCHLORIDE 300 MG: 300 TABLET, FILM COATED, EXTENDED RELEASE ORAL at 08:46

## 2019-12-07 RX ADMIN — ESCITALOPRAM OXALATE 20 MG: 20 TABLET ORAL at 08:46

## 2019-12-07 ASSESSMENT — ACTIVITIES OF DAILY LIVING (ADL)
HYGIENE/GROOMING: INDEPENDENT
DRESS: INDEPENDENT;STREET CLOTHES
ORAL_HYGIENE: INDEPENDENT
ORAL_HYGIENE: INDEPENDENT
DRESS: INDEPENDENT
HYGIENE/GROOMING: INDEPENDENT

## 2019-12-07 NOTE — PROGRESS NOTES
Patient reports good mood, denies current SI/SIB. Patient states he has not felt suicidal since Wednesday afternoon, and has actually felt quite normal since then. Patient denies depression or anxiety, or any unusual fatigue or other symptoms. Patient was visible and social with full range affect throughout the shift. Patient states he feels ready to discharge if his care team feels that is appropriate, but is also amenable to staying for the weekend.     12/06/19 2100   Behavioral Health   Hallucinations denies / not responding to hallucinations   Thinking intact   Orientation person: oriented;place: oriented;date: oriented;time: oriented   Memory baseline memory   Insight admits / accepts   Judgement intact   Eye Contact at examiner   Affect full range affect   Mood mood is calm   Physical Appearance/Attire appears stated age;attire appropriate to age and situation   Hygiene well groomed   Suicidality other (see comments)  (Denies)   1. Wish to be Dead (Recent) No   2. Non-Specific Active Suicidal Thoughts (Recent) No   Self Injury other (see comment)  (Denies)   Activity other (see comment)  (Visible and social.)   Speech clear;coherent   Medication Sensitivity no stated side effects;no observed side effects   Psychomotor / Gait balanced;steady   Therapeutic Recreation   Type of Intervention structured groups   Activity game   Response Participates, initiates socially appropriate   Hours 1   Activities of Daily Living   Hygiene/Grooming independent   Oral Hygiene independent   Dress independent   Room Organization independent

## 2019-12-07 NOTE — PROGRESS NOTES
Pt s mood was calm and had full range of affect. He was social with others. He participated in Art therapy and music therapy group. He stated he had no suicidal ideation or self-harm thoughts. He had no auditory or visual hallucination.

## 2019-12-07 NOTE — PROGRESS NOTES
Pt denies SI/SIB. Pt. Present in [facility].  With peers. Pt attended community meeting and watched tv with peers. Pt rates his depression at a 2 with 10 being the high. Pt stated he made a stupid decision days prior and reports he will not do that again. Pt is looking forward to visit with his family.     12/07/19 1107   Behavioral Health   Hallucinations denies / not responding to hallucinations   Thinking intact   Orientation person: oriented;place: oriented   Memory baseline memory   Insight admits / accepts   Judgement intact   Eye Contact at examiner   Affect blunted, flat   Mood mood is calm   Suicidality other (see comments)  (pt denies)   Self Injury other (see comment)  (pt denies)   Activity other (see comment)  (community meeting)   Activities of Daily Living   Hygiene/Grooming independent   Oral Hygiene independent   Dress independent   Room Organization independent

## 2019-12-07 NOTE — PROGRESS NOTES
12/06/19 [facility]   Therapeutic Recreation   Type of Intervention structured groups   Activity game   Response Participates, initiates socially appropriate   Hours 1     Pt actively participated in a structured Therapeutic Recreation group with a focus on leisure participation, stress reduction, and social engagement via a group game. Pt remained focused and engaged throughout full duration of group.  Showed progress in session goals. Pt mood was calm and was appropriate with interactions.  Appropriately shared sense of humor with peers during the group and appeared to brighten with social interaction. Pt was often laughing and smiling with peers during the group. Pt was very active in the demonstrating clues and descriptions for the activity.

## 2019-12-08 PROCEDURE — 12400001 ZZH R&B MH UMMC

## 2019-12-08 PROCEDURE — 25000132 ZZH RX MED GY IP 250 OP 250 PS 637: Performed by: STUDENT IN AN ORGANIZED HEALTH CARE EDUCATION/TRAINING PROGRAM

## 2019-12-08 RX ADMIN — BUPROPION HYDROCHLORIDE 300 MG: 300 TABLET, FILM COATED, EXTENDED RELEASE ORAL at 08:17

## 2019-12-08 RX ADMIN — ESCITALOPRAM OXALATE 20 MG: 20 TABLET ORAL at 08:17

## 2019-12-08 ASSESSMENT — ACTIVITIES OF DAILY LIVING (ADL)
DRESS: INDEPENDENT
ORAL_HYGIENE: INDEPENDENT
HYGIENE/GROOMING: INDEPENDENT

## 2019-12-08 ASSESSMENT — MIFFLIN-ST. JEOR: SCORE: 1539.27

## 2019-12-08 NOTE — PLAN OF CARE
Pt has shown improvement with his depressive symptoms since admission.  Pt's insight is very strong into the significance of his suicidal ideation, and into the stress it has caused him and his family.  Patient has identified many of the stressors in his professional life that precipitated the suicide attempt, and is taking steps with his employer and his family to ameliorate those stressors. Patient is very pleasant with staff and fully engaged with his peers in the milieu and in groups. Patient denies any further suicidal ideation.  Patient's med change appears to be helping.

## 2019-12-08 NOTE — PROGRESS NOTES
Pt was in a good mood this evening. He spent his time reading, socializing with select peers, and visiting with his family. He was calm, cooperative, and pleasant with staff and peers. He said his goal was to discuss his work stressors with his family, which he was able to do. He denies any mental health concerns. He said that being here has provided him with time to relax and he feels like the medication he is taking has worked well. He feels like he is mostly ready to leave and he plans to discharge either tomorrow or Monday.     12/07/19 2043   Behavioral Health   Hallucinations denies / not responding to hallucinations   Thinking intact   Orientation person: oriented;place: oriented;date: oriented;time: oriented   Memory baseline memory   Insight admits / accepts   Judgement intact   Eye Contact at examiner   Affect full range affect   Mood mood is calm   Physical Appearance/Attire appears stated age;attire appropriate to age and situation   Hygiene well groomed   Suicidality other (see comments)  (denies)   1. Wish to be Dead (Recent) No   2. Non-Specific Active Suicidal Thoughts (Recent) No   Self Injury other (see comment)  (denies)   Elopement   (none)   Activity other (see comment)  (visible in milieu)   Speech coherent;clear   Medication Sensitivity no stated side effects;no observed side effects   Psychomotor / Gait steady;balanced   Activities of Daily Living   Hygiene/Grooming independent   Oral Hygiene independent   Dress independent;street clothes   Room Organization independent

## 2019-12-09 VITALS
BODY MASS INDEX: 27.01 KG/M2 | TEMPERATURE: 97.5 F | DIASTOLIC BLOOD PRESSURE: 82 MMHG | HEIGHT: 67 IN | SYSTOLIC BLOOD PRESSURE: 125 MMHG | HEART RATE: 69 BPM | WEIGHT: 172.1 LBS | RESPIRATION RATE: 16 BRPM

## 2019-12-09 LAB — DEPRECATED CALCIDIOL+CALCIFEROL SERPL-MC: 32 UG/L (ref 20–75)

## 2019-12-09 PROCEDURE — 25000132 ZZH RX MED GY IP 250 OP 250 PS 637: Performed by: STUDENT IN AN ORGANIZED HEALTH CARE EDUCATION/TRAINING PROGRAM

## 2019-12-09 PROCEDURE — 99238 HOSP IP/OBS DSCHRG MGMT 30/<: CPT | Mod: GC | Performed by: PSYCHIATRY & NEUROLOGY

## 2019-12-09 RX ORDER — ESCITALOPRAM OXALATE 10 MG/1
20 TABLET ORAL DAILY
Qty: 60 TABLET | Refills: 0 | Status: SHIPPED | OUTPATIENT
Start: 2019-12-09

## 2019-12-09 RX ORDER — BUPROPION HYDROCHLORIDE 150 MG/1
300 TABLET ORAL EVERY MORNING
Qty: 60 TABLET | Refills: 0 | Status: SHIPPED | OUTPATIENT
Start: 2019-12-09

## 2019-12-09 RX ADMIN — BUPROPION HYDROCHLORIDE 300 MG: 300 TABLET, FILM COATED, EXTENDED RELEASE ORAL at 08:01

## 2019-12-09 RX ADMIN — ESCITALOPRAM OXALATE 20 MG: 20 TABLET ORAL at 08:01

## 2019-12-09 NOTE — PROGRESS NOTES
"The pt is looking forward to discharge today. He feels that his aftercare is set up well enough that he has :\"support right out of the gate.\" He has several small goals established for himself for after discharge that are realistic and achievable. He is calm and pleasant in the milieu, social with others, and attends groups. He denies any SI/SIB urges and hallucinations. He is well groomed, and is eating normally. The pt has no concerns for his pending discharge this afternoon.     12/09/19 1025   Behavioral Health   Hallucinations denies / not responding to hallucinations   Thinking intact   Orientation person: oriented;place: oriented;date: oriented;time: oriented   Memory baseline memory   Insight insight appropriate to situation   Judgement intact   Eye Contact at examiner   Affect full range affect   Mood mood is calm   Physical Appearance/Attire attire appropriate to age and situation;neat   Hygiene well groomed   Suicidality other (see comments)  (pt denies)   1. Wish to be Dead (Recent) No   2. Non-Specific Active Suicidal Thoughts (Recent) No   Self Injury other (see comment)  (pt denies)   Elopement   (nothing to report)   Activity other (see comment)  (visible at times, social with others)   Speech clear;coherent   Medication Sensitivity no stated side effects;no observed side effects   Psychomotor / Gait balanced;steady     "

## 2019-12-09 NOTE — DISCHARGE SUMMARY
"    Psychiatric Discharge Summary    Hospital Day #4    Arash Booker MRN# [id number]   Age: 64 year old YOB: 1955     Date of Admission:  12/5/2019  Date of Discharge:  12/9/2019  Admitting Physician:  Trent Tompkins MD  Discharge Physician:  Trent Tompkins MD    Event Leading to Hospitalization:   Per H&P:    Arash Booker is a 64 year old male with a self-reported past psychiatric history of OCD and bipolar II disorder who transferred from the [facility] ED on 12/05/2019 after a suicide attempt on 12/4/19 in the context of increasing work-related stressors, no substance use and good medication adherence (currently taking bupropion 150 mg daily and escitalopram 10 mg daily).     Arash states things began with increasing pressure at his work. 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 This past summer, the  of the company told him that the company was struggling financially, and shortly after, his administrative duties increased. He has had to manage many of the company's problems, and this has placed increasing pressure on him. Things got to the point that 1 1/2 weeks ago, he began having suicidal ideation. He did not act on it until yesterday.     He did not go to work yesterday due to feeling depressed and overwhelmed. After his wife left for work, he went to the kitchen and took a knife to his neck. At that instant, he reports he truly felt like he wanted to end his life. He cut 2 superficial puncture wounds and then stopped, feeling he could not go through with it. He then \"went and took a shower and cleaned up and sat back in the chair I was sitting in when my wife left. She wasn't happy when she came home and saw me in the same chair as when she left me.\" He notes that he thought about covering up his neck wound with a band-aid and telling her he had a shaving accident. His wife did see the cut and became extremely worried because he had a " "history of a suicide attempt back in 2000 where he attempted with carbon monoxide poisoning, running 2 cars in a closed garage. His wife works in medical billing and was hesitant to have him be admitted psychiatrically because of the high restrictions on psychiatric units. His daughter, who was also extremely worried about him, encouraged him to go the hospital at [facility].      Currently he is not feeling suicidal, and states his actions were \"dumb\". He does want to get help and asked who he would be speaking to tomorrow and if there were group therapy sessions. Of note, he appears high functioning and genuinely engaged in seeking help. For the past week and a half, he has been experiencing low energy, feelings of helplessness/hopelessness, SI and irritability. He denies having any sleep or appetite issues and does not have any guilt or blame on himself. He reports no symptoms of alem, panic, psychosis, or anxiety other than his job worries.     He has a self-reported diagnosis of bipolar II, and he states that he primarily has depressive episodes. When asked about any manic symptoms, he stated he has had an elevated mood with grand ideas in the past, but never engaged in risky behavior or had decreased need for sleep. His reported that his behavior never got him into any trouble or impaired his ability to function in work and relationships. He is currently taking bupropion and escitalopram, and he has for the past 19+ years. He has been on the same maintenance dose for the past 6-7 years, and is consistently adherent. He does not have a primary psychiatrist and gets his prescriptions through his primary care doctor. He requested to continue with his PTA medications, and he did not receive them today in the ED at [facility].     He reports having no medical history, no history of any trauma, and no use of any drugs or nicotine. He drinks occasionally, and reports no past struggles with alcohol abuse. His neck " "wound was appropriately dressed, and he had no other medical concerns tonight.     Per ED Note:   \"Arash Booker is a 64 year old male, with a history of OCD and bipolar 2 disorder, who presents with his daughter and son-in-law to the emergency department for evaluation of suicidal ideation. The patient reports he has been under increased stress at work and more financial pressure. He reports this stress and pressure became overwhelming to him today and he used a kitchen knife to cut his neck this afternoon around 1400 intending to harm himself. He denies any suicide attempts in the past. Of note, patient's last tetanus was in 2012.     Impression & Plan  Medical Decision Making:  This patient is brought into the emerge department by his family after he attempted to hurt himself by stabbing himself in the neck with a knife.  He endorses thoughts of suicide with an active plan.  He will be admitted to the hospital and is initially voluntary but if he should decide he is not voluntary he is appropriate for a 72-hour hold.  \"     He was medically cleared for admission to inpatient psychiatric unit.     ED/Hospital Course   In the ED, his neck wound was examined and dressed. Vascular surgery was consulted and evaluated him at [facility]. His lacerations showed no signs of a hematoma, or injury to his airway or major vasculature. He was calm and cooperative, requiring no PRNs. On admission to [facility], his PTA medications of bupropion and escitalopram were resumed.       See admission note by Trent Tompkins MD on 12/1/19 for additional details.     Diagnoses:   Principal psychiatric diagnosis: Bipolar affective disorder, type 2, current episode depressed    Consults:     None.     Hospital Course:   Psychiatric Course:  Arash Booker was admitted to [facility] as a voluntary patient under the care of Dr. Tompkins on 12/5. The next day, his bupropion was increased to 300 mg daily and escitalopram was increased to 20 " mg daily. He tolerated these changes without side effects. He reported feeling happy that his suicide attempt did not result in death. He reported no suicidal ideation or thoughts of self-harm over the weekend. On the day of discharge, he reported that he felt ready for discharge and did not have suicidal ideation.    Arash Booker was discharged to home. At the time of discharge Arash Booker was determined to not be a danger to himself or others.    Medical Course:  Arash Booker was cleared for admission to the inpatient psychiatric unit. His neck wound was examined and dressed in the ED. Vascular surgery was consulted prior to admission to the inpatient unit. He was evaluated at [facility] and not found to have any signs of hematoma or injury to his airway or major vasculature.     Risk Assessment:  Arash Booker has notable risk factors for self-harm, including history of two suicide attempts, stress at work, and mood disorder. However, risk is mitigated by ability to volunteer a safety plan and not having access to lethal means. Additional steps taken to minimize risk include: development of a safety plan with his family (Arash reports that he and his family have devised a system for providing him with support) and setting him up with an outpatient psychiatrist. Therefore, based on all available evidence including the factors cited above, Arash Booker does not appear to be at imminent risk for self-harm, and is appropriate for outpatient level of care.     This document serves as a transfer of care to Arash Bokoer's outpatient providers.    Discharge Medications:     Current Discharge Medication List      CONTINUE these medications which have CHANGED    Details   buPROPion (WELLBUTRIN XL) 150 MG 24 hr tablet Take 2 tablets (300 mg) by mouth every morning  Qty: 60 tablet, Refills: 0    Associated Diagnoses: Depressive episode      escitalopram (LEXAPRO) 10 MG tablet Take 2 tablets (20 mg) by mouth  daily  Qty: 60 tablet, Refills: 0    Associated Diagnoses: Depressive episode             Psychiatric Examination:   Appearance:  awake, alert, appeared as age stated, neatly groomed and casually dressed  Attitude:  cooperative  Eye Contact:  good  Mood:  better  Affect:  mood congruent and intensity is normal  Speech:  clear, coherent  Psychomotor Behavior:  no evidence of tardive dyskinesia, dystonia, or tics  Thought Process:  logical, linear and goal oriented  Associations:  no loose associations  Thought Content:  Does not appear to be responding to internal stimuli. Denies suicidal ideation.  Insight:  good  Judgment:  intact  Oriented to:  Seems oriented to situation.  Attention Span and Concentration:  Adequate for conversation.  Recent and Remote Memory:  Seems intact  Language: Speaks English fluently   Fund of Knowledge: Seems appropriate  Muscle Strength and Tone: Not assessed  Gait and Station: Normal         Discharge Plan:   #Bipolar affective disorder, type II, depressive episode  - Continue bupropion 300 mg daily.  - Continue escitalopram 20 mg daily.  - Psychiatry appointment with Patrick Schoenecker CNP at 7:45 am on 12/16    Patient seen and discussed with attending psychiatrist MD Sadaf Elizabeth MD  PGY-1 Resident     Attestation:  Trent CARABALLO, saw and evaluated the patient with the resident physician.  I agree with the findings and plan of care as documented in the resident note.  I have reviewed all labs and vital signs.  Trent CARABALLO, have reviewed this summary and agree with the findings and discharge plan as written.                Appendix A: All Labs This Admission:     Results for orders placed or performed during the hospital encounter of 12/05/19   CBC with platelets differential     Status: None   Result Value Ref Range    WBC 6.2 4.0 - 11.0 10e9/L    RBC Count 4.68 4.4 - 5.9 10e12/L    Hemoglobin 14.7 13.3 - 17.7 g/dL    Hematocrit 44.7 40.0 - 53.0 %    MCV  96 78 - 100 fl    MCH 31.4 26.5 - 33.0 pg    MCHC 32.9 31.5 - 36.5 g/dL    RDW 12.0 10.0 - 15.0 %    Platelet Count 249 150 - 450 10e9/L    Diff Method Automated Method     % Neutrophils 69.3 %    % Lymphocytes 22.0 %    % Monocytes 6.9 %    % Eosinophils 1.3 %    % Basophils 0.3 %    % Immature Granulocytes 0.2 %    Nucleated RBCs 0 0 /100    Absolute Neutrophil 4.3 1.6 - 8.3 10e9/L    Absolute Lymphocytes 1.4 0.8 - 5.3 10e9/L    Absolute Monocytes 0.4 0.0 - 1.3 10e9/L    Absolute Eosinophils 0.1 0.0 - 0.7 10e9/L    Absolute Basophils 0.0 0.0 - 0.2 10e9/L    Abs Immature Granulocytes 0.0 0 - 0.4 10e9/L    Absolute Nucleated RBC 0.0    Comprehensive metabolic panel     Status: Abnormal   Result Value Ref Range    Sodium 136 133 - 144 mmol/L    Potassium 4.2 3.4 - 5.3 mmol/L    Chloride 104 94 - 109 mmol/L    Carbon Dioxide 29 20 - 32 mmol/L    Anion Gap 3 3 - 14 mmol/L    Glucose 102 (H) 70 - 99 mg/dL    Urea Nitrogen 20 7 - 30 mg/dL    Creatinine 1.11 0.66 - 1.25 mg/dL    GFR Estimate 71 >60 mL/min/[1.73_m2]    GFR Estimate If Black 82 >60 mL/min/[1.73_m2]    Calcium 8.8 8.5 - 10.1 mg/dL    Bilirubin Total 0.5 0.2 - 1.3 mg/dL    Albumin 3.9 3.4 - 5.0 g/dL    Protein Total 7.6 6.8 - 8.8 g/dL    Alkaline Phosphatase 71 40 - 150 U/L    ALT 30 0 - 70 U/L    AST 17 0 - 45 U/L   TSH with free T4 reflex and/or T3 as indicated     Status: None   Result Value Ref Range    TSH 2.02 0.40 - 4.00 mU/L   Vitamin B12     Status: None   Result Value Ref Range    Vitamin B12 357 193 - 986 pg/mL

## 2019-12-09 NOTE — PROGRESS NOTES
Pt s mood was calm and had full range of affect. He was social with others. His family was here to visit. The visit went well. He stated he had no suicidal ideation or self-harm thoughts. He had no auditory or visual hallucination. He stated he  is ready to go home .

## 2019-12-09 NOTE — DISCHARGE INSTRUCTIONS
Behavioral Discharge Planning and Instructions    Summary:   You were admitted to [facility] on 12/5/19 with worsening depression, suicide gesture in the context of work stress under the care of Dr. Tompkins.  You met with Dr. Tompkins and his team daily for ongoing psychiatric assessment and medication management.  You had opportunities to participate in therapeutic groups on the unit.   At this time you report your mood is stabilizing and you report you are not having thoughts or intent to harm yourself or others. You will be discharged home and will resume care with your outpatient providers.    Disposition:  Home    Main Diagnosis:   Major Depressive Disorder      Major Treatments, Procedures and Findings:   Medications were  managed throughout your stay. An internal medicine consult was completed during your stay. You had the opportunity to participate in treatment programming while on the unit including occupational therapy, mental health support and education and spiritual services.     Symptoms to Report:   Please report if you are experiencing increased aggression and/or confusion, problematic loss of sleep, worsening mood, or thoughts of suicide to your treatment team or notify your primary provider.   IF THE SYMPTOMS YOU ARE EXPERIENCING ARE A MEDICAL EMERGENCY, CALL 911 IMMEDIATELY    Lifestyle Adjustment:   1. Adjust your lifestyle to get enough sleep, relaxation, exercise and good nutrition.  Continue to develop healthy coping skills to decrease stress and promote a healthy lifestyle.  2. Abstain from all substances of abuse.  3. Take medications as prescribed.  Please work with your doctor to discuss any concerns you have with your medications or side effects you may be experiencing.  4. Follow up with appointments as scheduled.        Psychiatry Follow-up:   Appointment:  Psychiatry: Patrick Schoenecker CNP:  12/16/19 at 7:45am  [facility] [address] [place], [place]  "[id number]  [phone]  Fax [phone]    Resources:   *[facility] Crisis: COPE: ([phone]) 24 hour mobile crisis support for people having a mental health crisis in [facility].   *Acute Psychiatric Services ([phone]). 24-hour walk-in crisis psychiatric support at [facility]; Emergency Medications Clinic available 7:30am - 2:00pm  *[facility]: Text  \"life\"  to [phone]   A trained crisis counselor will respond immediately  *[facility]: ([phone])  24-hour confidential telephone counseling   *[facility] Emergency Room: [phone]      General Medication Instructions:   See your medication sheet(s) for instructions.   Take all medicines as directed.  Make no changes unless your doctor suggests them.   Go to all your doctor visits.  Be sure to have all your required lab tests. This way, your medicines can be refilled on time.  Do not use any drugs not prescribed by your doctor.    The treatment team has appreciated the opportunity to work with you.  We wish you the best in the future.    If you have any questions or concerns our unit number is [phone].     "

## 2019-12-09 NOTE — PLAN OF CARE
Pt is scheduled to be discharged today.  Patient does not have any suicidal ideation or behaviors.  Pt has a very extensive support system that is willing and able to help the patient following discharge.  Pt has taken steps and will continue to take further steps to reduce his external stressors in order to prevent anxiety and overall stress.  Patient has excellent insight into his condition and is fully prepared to return to his life outside the hospital.  Patient is ready for discharge.

## 2019-12-09 NOTE — PROGRESS NOTES
12/08/19 2100   Behavioral Health   Hallucinations denies / not responding to hallucinations   Thinking intact   Orientation person: oriented;place: oriented;date: oriented;time: oriented   Memory baseline memory   Insight admits / accepts   Judgement intact   Eye Contact at examiner   Affect full range affect   Mood mood is calm   Physical Appearance/Attire attire appropriate to age and situation   Hygiene well groomed   Suicidality other (see comments)  (None stated or observed)   1. Wish to be Dead (Recent) No   2. Non-Specific Active Suicidal Thoughts (Recent) No   Self Injury other (see comment)  (None stated or observed)   Activity other (see comment)  (Pt active in the milieu)   Speech clear;coherent   Medication Sensitivity no stated side effects;no observed side effects   Psychomotor / Gait balanced;steady   Safety   Suicidality Status 15   Activities of Daily Living   Hygiene/Grooming independent   Oral Hygiene independent   Dress independent   Room Organization independent     Pt had a calm demeanor throughout the night.  Pt was out in the milieu the majority of the evening.  Pt had a visit with family that was positive.  Pt stated in community meeting that he was ready to discuss the next steps towards discharge with his family and team soon.  No SI/SIB was stated or observed.

## 2019-12-10 NOTE — PROGRESS NOTES
Pt was discharged home. He verbalized understanding and was in agreement with the follow-up discharge plans. He denied any SI at time of discharge.   Yes

## 2021-01-15 ENCOUNTER — HEALTH MAINTENANCE LETTER (OUTPATIENT)
Age: 66
End: 2021-01-15

## 2021-09-04 ENCOUNTER — HEALTH MAINTENANCE LETTER (OUTPATIENT)
Age: 66
End: 2021-09-04

## 2022-02-13 ENCOUNTER — HEALTH MAINTENANCE LETTER (OUTPATIENT)
Age: 67
End: 2022-02-13

## 2022-07-31 ENCOUNTER — HEALTH MAINTENANCE LETTER (OUTPATIENT)
Age: 67
End: 2022-07-31

## 2022-10-16 ENCOUNTER — HEALTH MAINTENANCE LETTER (OUTPATIENT)
Age: 67
End: 2022-10-16

## 2023-03-09 ENCOUNTER — APPOINTMENT (OUTPATIENT)
Dept: URBAN - METROPOLITAN AREA CLINIC 259 | Age: 68
Setting detail: DERMATOLOGY
End: 2023-03-15

## 2023-03-09 DIAGNOSIS — D18.0 HEMANGIOMA: ICD-10-CM

## 2023-03-09 DIAGNOSIS — M71 OTHER BURSOPATHIES: ICD-10-CM

## 2023-03-09 DIAGNOSIS — B35.1 TINEA UNGUIUM: ICD-10-CM

## 2023-03-09 DIAGNOSIS — L81.4 OTHER MELANIN HYPERPIGMENTATION: ICD-10-CM

## 2023-03-09 DIAGNOSIS — L57.8 OTHER SKIN CHANGES DUE TO CHRONIC EXPOSURE TO NONIONIZING RADIATION: ICD-10-CM

## 2023-03-09 DIAGNOSIS — D22 MELANOCYTIC NEVI: ICD-10-CM

## 2023-03-09 DIAGNOSIS — Z71.89 OTHER SPECIFIED COUNSELING: ICD-10-CM

## 2023-03-09 DIAGNOSIS — L82.1 OTHER SEBORRHEIC KERATOSIS: ICD-10-CM

## 2023-03-09 DIAGNOSIS — L57.0 ACTINIC KERATOSIS: ICD-10-CM

## 2023-03-09 PROBLEM — M71.372 OTHER BURSAL CYST, LEFT ANKLE AND FOOT: Status: ACTIVE | Noted: 2023-03-09

## 2023-03-09 PROBLEM — D18.01 HEMANGIOMA OF SKIN AND SUBCUTANEOUS TISSUE: Status: ACTIVE | Noted: 2023-03-09

## 2023-03-09 PROBLEM — D22.5 MELANOCYTIC NEVI OF TRUNK: Status: ACTIVE | Noted: 2023-03-09

## 2023-03-09 PROCEDURE — 17000 DESTRUCT PREMALG LESION: CPT

## 2023-03-09 PROCEDURE — OTHER COUNSELING: OTHER

## 2023-03-09 PROCEDURE — OTHER LIQUID NITROGEN: OTHER

## 2023-03-09 PROCEDURE — OTHER MIPS QUALITY: OTHER

## 2023-03-09 PROCEDURE — 99203 OFFICE O/P NEW LOW 30 MIN: CPT | Mod: 25

## 2023-03-09 PROCEDURE — 17003 DESTRUCT PREMALG LES 2-14: CPT

## 2023-03-09 ASSESSMENT — LOCATION SIMPLE DESCRIPTION DERM
LOCATION SIMPLE: LEFT UPPER BACK
LOCATION SIMPLE: RIGHT UPPER BACK
LOCATION SIMPLE: RIGHT 2ND TOE
LOCATION SIMPLE: LEFT 2ND TOE

## 2023-03-09 ASSESSMENT — LOCATION DETAILED DESCRIPTION DERM
LOCATION DETAILED: LEFT 2ND TOENAIL
LOCATION DETAILED: LEFT MID-UPPER BACK
LOCATION DETAILED: LEFT MEDIAL UPPER BACK
LOCATION DETAILED: LEFT INFERIOR MEDIAL UPPER BACK
LOCATION DETAILED: RIGHT SUPERIOR UPPER BACK
LOCATION DETAILED: RIGHT INFERIOR MEDIAL UPPER BACK
LOCATION DETAILED: RIGHT SUPERIOR MEDIAL UPPER BACK
LOCATION DETAILED: LEFT DORSAL 2ND TOE
LOCATION DETAILED: RIGHT 2ND TOENAIL

## 2023-03-09 ASSESSMENT — LOCATION ZONE DERM
LOCATION ZONE: TOENAIL
LOCATION ZONE: TOE
LOCATION ZONE: TRUNK

## 2023-03-09 NOTE — PROCEDURE: LIQUID NITROGEN
Detail Level: Zone
Consent: The patient's consent was obtained including but not limited to risks of crusting, scabbing, blistering, scarring, darker or lighter pigmentary change, recurrence, incomplete removal and infection.
Total Number Of Aks Treated: 2
Post-Care Instructions: I reviewed with the patient in detail post-care instructions. Patient is to wear sunprotection, and avoid picking at any of the treated lesions. Pt may apply Vaseline to crusted or scabbing areas.
Duration Of Freeze Thaw-Cycle (Seconds): 0
Render In Bullet Format When Appropriate: No

## 2023-08-26 ENCOUNTER — HEALTH MAINTENANCE LETTER (OUTPATIENT)
Age: 68
End: 2023-08-26

## 2025-05-15 ENCOUNTER — APPOINTMENT (OUTPATIENT)
Dept: URBAN - METROPOLITAN AREA CLINIC 257 | Age: 70
Setting detail: DERMATOLOGY
End: 2025-05-15

## 2025-05-15 VITALS — WEIGHT: 190 LBS | HEIGHT: 67 IN

## 2025-05-15 DIAGNOSIS — L81.4 OTHER MELANIN HYPERPIGMENTATION: ICD-10-CM

## 2025-05-15 DIAGNOSIS — D22 MELANOCYTIC NEVI: ICD-10-CM

## 2025-05-15 DIAGNOSIS — L82.1 OTHER SEBORRHEIC KERATOSIS: ICD-10-CM

## 2025-05-15 DIAGNOSIS — Z71.89 OTHER SPECIFIED COUNSELING: ICD-10-CM

## 2025-05-15 DIAGNOSIS — D18.0 HEMANGIOMA: ICD-10-CM

## 2025-05-15 DIAGNOSIS — L57.8 OTHER SKIN CHANGES DUE TO CHRONIC EXPOSURE TO NONIONIZING RADIATION: ICD-10-CM

## 2025-05-15 PROBLEM — D22.5 MELANOCYTIC NEVI OF TRUNK: Status: ACTIVE | Noted: 2025-05-15

## 2025-05-15 PROBLEM — D18.01 HEMANGIOMA OF SKIN AND SUBCUTANEOUS TISSUE: Status: ACTIVE | Noted: 2025-05-15

## 2025-05-15 PROCEDURE — OTHER COUNSELING: OTHER

## 2025-05-15 PROCEDURE — OTHER MIPS QUALITY: OTHER

## 2025-05-15 PROCEDURE — 99213 OFFICE O/P EST LOW 20 MIN: CPT

## 2025-05-15 ASSESSMENT — LOCATION DETAILED DESCRIPTION DERM
LOCATION DETAILED: LEFT SUPERIOR MEDIAL UPPER BACK
LOCATION DETAILED: LEFT MEDIAL UPPER BACK

## 2025-05-15 ASSESSMENT — LOCATION SIMPLE DESCRIPTION DERM: LOCATION SIMPLE: LEFT UPPER BACK

## 2025-05-15 ASSESSMENT — LOCATION ZONE DERM: LOCATION ZONE: TRUNK
